# Patient Record
Sex: FEMALE | Race: WHITE | Employment: STUDENT | URBAN - METROPOLITAN AREA
[De-identification: names, ages, dates, MRNs, and addresses within clinical notes are randomized per-mention and may not be internally consistent; named-entity substitution may affect disease eponyms.]

---

## 2023-04-27 ENCOUNTER — OFFICE VISIT (OUTPATIENT)
Dept: PRIMARY CARE CLINIC | Age: 23
End: 2023-04-27

## 2023-04-27 VITALS
TEMPERATURE: 98 F | OXYGEN SATURATION: 99 % | SYSTOLIC BLOOD PRESSURE: 108 MMHG | HEIGHT: 67 IN | RESPIRATION RATE: 20 BRPM | DIASTOLIC BLOOD PRESSURE: 80 MMHG | BODY MASS INDEX: 24.23 KG/M2 | WEIGHT: 154.4 LBS | HEART RATE: 104 BPM

## 2023-04-27 DIAGNOSIS — Z02.5 ROUTINE SPORTS PHYSICAL EXAM: Primary | ICD-10-CM

## 2023-04-27 NOTE — PROGRESS NOTES
kg)   LMP 04/23/2023 (Exact Date)   SpO2 99%   BMI 24.18 kg/m²    Oxygen Saturation Interpretation: Normal.    Constitutional:  A&Ox3, NAD, development consistent with age. Head:  NCAT  Eyes:  EOMI, PERRLA. No conjunctival injection noted. Ears:  External ears without lesions. Ear canals without swelling or exudate. TMs translucent bilaterally with normal light reflex. Throat:  Posterior pharynx without erythema or exudate. Airway patient. Neck:  Supple. Nontender without adenopathy or thyromegaly. Lungs: CTAB without wheezing, rales, or rhonchi. Heart:  RRR, normal heart sounds without pathological murmurs. Abdomen:  Soft, nontender, and nondistended. BS+x4. No guarding, rigidity, or rebound tenderness. No masses. Back:  ROM physiologic. Normal posture and spinal alignment. No costovertebral, paravertebral, intervertebral, or vertebral tenderness or spasm. Extremities:  No tenderness or swelling. ROM physiologic. No neurovascular deficit. Strength and  5/5 bilaterally. Skin:  Warm and appropriately dry without rashes, abrasions, ecchymoses, or lesions. Neuro:  Orientation age-appropriate. Motor functions intact. DTRs 2+ and equal bilaterally. Psych: Pleasant and appropriate. Normal mood and affect. Test Results Section   (All laboratory and radiology results have been personally reviewed by myself)  Labs:  No results found for this visit on 04/27/23. Imaging: All Radiology results interpreted by Radiologist unless otherwise noted. No results found. Visual Acuity:  Vision Screening    Right eye Left eye Both eyes   Without correction 20/15 20/15 20/15   With correction        Assessment / Plan   Impression(s):  Nova Zheng was seen today for employment physical.    Diagnoses and all orders for this visit:    Routine sports physical exam  -     RI SCREENING TEST VISUAL ACUITY QUANTITATIVE BILAT    Pt appears to be in good health overall.  She is cleared for sports for one year from